# Patient Record
Sex: FEMALE | Race: AMERICAN INDIAN OR ALASKA NATIVE | NOT HISPANIC OR LATINO | Employment: OTHER | URBAN - METROPOLITAN AREA
[De-identification: names, ages, dates, MRNs, and addresses within clinical notes are randomized per-mention and may not be internally consistent; named-entity substitution may affect disease eponyms.]

---

## 2017-01-01 ENCOUNTER — HOSPITAL ENCOUNTER (EMERGENCY)
Facility: MEDICAL CENTER | Age: 32
End: 2017-01-02
Attending: EMERGENCY MEDICINE

## 2017-01-01 ENCOUNTER — APPOINTMENT (OUTPATIENT)
Dept: RADIOLOGY | Facility: MEDICAL CENTER | Age: 32
End: 2017-01-01
Attending: EMERGENCY MEDICINE

## 2017-01-01 DIAGNOSIS — M94.0 COSTOCHONDRITIS: ICD-10-CM

## 2017-01-01 DIAGNOSIS — R07.81 PLEURITIC CHEST PAIN: ICD-10-CM

## 2017-01-01 LAB
ALBUMIN SERPL BCP-MCNC: 4.1 G/DL (ref 3.2–4.9)
ALBUMIN/GLOB SERPL: 1.5 G/DL
ALP SERPL-CCNC: 76 U/L (ref 30–99)
ALT SERPL-CCNC: 14 U/L (ref 2–50)
ANION GAP SERPL CALC-SCNC: 8 MMOL/L (ref 0–11.9)
AST SERPL-CCNC: 14 U/L (ref 12–45)
BASOPHILS # BLD AUTO: 0.7 % (ref 0–1.8)
BASOPHILS # BLD: 0.06 K/UL (ref 0–0.12)
BILIRUB SERPL-MCNC: 0.5 MG/DL (ref 0.1–1.5)
BUN SERPL-MCNC: 10 MG/DL (ref 8–22)
CALCIUM SERPL-MCNC: 9.3 MG/DL (ref 8.5–10.5)
CHLORIDE SERPL-SCNC: 107 MMOL/L (ref 96–112)
CO2 SERPL-SCNC: 22 MMOL/L (ref 20–33)
CREAT SERPL-MCNC: 0.77 MG/DL (ref 0.5–1.4)
EKG IMPRESSION: NORMAL
EOSINOPHIL # BLD AUTO: 0.38 K/UL (ref 0–0.51)
EOSINOPHIL NFR BLD: 4.7 % (ref 0–6.9)
ERYTHROCYTE [DISTWIDTH] IN BLOOD BY AUTOMATED COUNT: 46.5 FL (ref 35.9–50)
GFR SERPL CREATININE-BSD FRML MDRD: >60 ML/MIN/1.73 M 2
GLOBULIN SER CALC-MCNC: 2.7 G/DL (ref 1.9–3.5)
GLUCOSE SERPL-MCNC: 95 MG/DL (ref 65–99)
HCG SERPL QL: NEGATIVE
HCT VFR BLD AUTO: 40.7 % (ref 37–47)
HGB BLD-MCNC: 13.1 G/DL (ref 12–16)
IMM GRANULOCYTES # BLD AUTO: 0.05 K/UL (ref 0–0.11)
IMM GRANULOCYTES NFR BLD AUTO: 0.6 % (ref 0–0.9)
LYMPHOCYTES # BLD AUTO: 2.59 K/UL (ref 1–4.8)
LYMPHOCYTES NFR BLD: 32 % (ref 22–41)
MCH RBC QN AUTO: 28.4 PG (ref 27–33)
MCHC RBC AUTO-ENTMCNC: 32.2 G/DL (ref 33.6–35)
MCV RBC AUTO: 88.1 FL (ref 81.4–97.8)
MONOCYTES # BLD AUTO: 0.66 K/UL (ref 0–0.85)
MONOCYTES NFR BLD AUTO: 8.1 % (ref 0–13.4)
NEUTROPHILS # BLD AUTO: 4.36 K/UL (ref 2–7.15)
NEUTROPHILS NFR BLD: 53.9 % (ref 44–72)
NRBC # BLD AUTO: 0 K/UL
NRBC BLD AUTO-RTO: 0 /100 WBC
PLATELET # BLD AUTO: 247 K/UL (ref 164–446)
PMV BLD AUTO: 10.7 FL (ref 9–12.9)
POTASSIUM SERPL-SCNC: 3.8 MMOL/L (ref 3.6–5.5)
PROT SERPL-MCNC: 6.8 G/DL (ref 6–8.2)
RBC # BLD AUTO: 4.62 M/UL (ref 4.2–5.4)
SODIUM SERPL-SCNC: 137 MMOL/L (ref 135–145)
TROPONIN I SERPL-MCNC: <0.01 NG/ML (ref 0–0.04)
WBC # BLD AUTO: 8.1 K/UL (ref 4.8–10.8)

## 2017-01-01 PROCEDURE — 94760 N-INVAS EAR/PLS OXIMETRY 1: CPT

## 2017-01-01 PROCEDURE — 99285 EMERGENCY DEPT VISIT HI MDM: CPT

## 2017-01-01 PROCEDURE — 84484 ASSAY OF TROPONIN QUANT: CPT

## 2017-01-01 PROCEDURE — 84703 CHORIONIC GONADOTROPIN ASSAY: CPT

## 2017-01-01 PROCEDURE — 80053 COMPREHEN METABOLIC PANEL: CPT

## 2017-01-01 PROCEDURE — 85025 COMPLETE CBC W/AUTO DIFF WBC: CPT

## 2017-01-01 PROCEDURE — 71020 DX-CHEST-2 VIEWS: CPT

## 2017-01-01 PROCEDURE — 93005 ELECTROCARDIOGRAM TRACING: CPT

## 2017-01-01 PROCEDURE — 96374 THER/PROPH/DIAG INJ IV PUSH: CPT

## 2017-01-01 PROCEDURE — 93005 ELECTROCARDIOGRAM TRACING: CPT | Performed by: EMERGENCY MEDICINE

## 2017-01-01 PROCEDURE — 700111 HCHG RX REV CODE 636 W/ 250 OVERRIDE (IP): Performed by: EMERGENCY MEDICINE

## 2017-01-01 RX ORDER — KETOROLAC TROMETHAMINE 30 MG/ML
30 INJECTION, SOLUTION INTRAMUSCULAR; INTRAVENOUS ONCE
Status: COMPLETED | OUTPATIENT
Start: 2017-01-01 | End: 2017-01-01

## 2017-01-01 RX ADMIN — KETOROLAC TROMETHAMINE 30 MG: 30 INJECTION, SOLUTION INTRAMUSCULAR; INTRAVENOUS at 23:14

## 2017-01-01 ASSESSMENT — LIFESTYLE VARIABLES
EVER FELT BAD OR GUILTY ABOUT YOUR DRINKING: NO
HAVE YOU EVER FELT YOU SHOULD CUT DOWN ON YOUR DRINKING: NO
HOW MANY TIMES IN THE PAST YEAR HAVE YOU HAD 5 OR MORE DRINKS IN A DAY: 0
TOTAL SCORE: 0
CONSUMPTION TOTAL: NEGATIVE
DO YOU DRINK ALCOHOL: YES
ON A TYPICAL DAY WHEN YOU DRINK ALCOHOL HOW MANY DRINKS DO YOU HAVE: 3
HAVE PEOPLE ANNOYED YOU BY CRITICIZING YOUR DRINKING: NO
EVER HAD A DRINK FIRST THING IN THE MORNING TO STEADY YOUR NERVES TO GET RID OF A HANGOVER: NO
AVERAGE NUMBER OF DAYS PER WEEK YOU HAVE A DRINK CONTAINING ALCOHOL: 1
TOTAL SCORE: 0
TOTAL SCORE: 0

## 2017-01-01 ASSESSMENT — ENCOUNTER SYMPTOMS
FEVER: 0
CHILLS: 0
DIAPHORESIS: 0

## 2017-01-01 ASSESSMENT — PAIN SCALES - GENERAL: PAINLEVEL_OUTOF10: 7

## 2017-01-01 NOTE — ED AVS SNAPSHOT
After Visit Summary                                                                                                                Radha oLve   MRN: [id number]    Department:  [facility], Emergency Dept   Date of Visit:  1/1/2017            [facility], Emergency Dept    [address] [address]    Phone:  [phone]      You were seen by     Pete Vaughn M.D.      Your Diagnosis Was     Costochondritis     M94.0       These are the medications you received during your hospitalization from 01/01/2017 2106 to 01/02/2017 0012     Date/Time Order Dose Route Action    01/01/2017 2314 ketorolac (TORADOL) injection 30 mg 30 mg Intravenous Given      Follow-up Information     1. Follow up with Your Physician. Schedule an appointment as soon as possible for a visit in 1 week.    Specialty:  Emergency Medicine    Contact information    Varies          2. Follow up with [facility].    Why:  If you need a doctor    Contact information    [address]  [facility] [address] [phone]          3. Follow up with [facility].    Why:  If you need a doctor    Contact information    [address] [address] [phone]      Medication Information     Review all of your home medications and newly ordered medications with your primary doctor and/or pharmacist as soon as possible. Follow medication instructions as directed by your doctor and/or pharmacist.     Please keep your complete medication list with you and share with your physician. Update the information when medications are discontinued, doses are changed, or new medications (including over-the-counter products) are added; and carry medication information at all times in the event of emergency situations.               Medication List      START taking these medications        Instructions    ibuprofen 600 MG Tabs   Commonly known as:  MOTRIN    Take 1 Tab by mouth every 6  hours as needed.   Dose:  600 mg               Procedures and tests performed during your visit     CBC WITH DIFFERENTIAL    COMP METABOLIC PANEL    Cardiac Monitoring    DX-CHEST-2 VIEWS    EKG (ER)    EKG (NOW)    ESTIMATED GFR    HCG Qual Serum    IV Saline Lock    Obtain Old EKG    Pulse Ox    TROPONIN        Discharge Instructions       Return if you have new or different chest pain, shortness of breath, cough up blood, fever, rash or pass out.     Costochondritis  Costochondritis, sometimes called Tietze syndrome, is a swelling and irritation (inflammation) of the tissue (cartilage) that connects your ribs with your breastbone (sternum). It causes pain in the chest and rib area. Costochondritis usually goes away on its own over time. It can take up to 6 weeks or longer to get better, especially if you are unable to limit your activities.  CAUSES   Some cases of costochondritis have no known cause. Possible causes include:  · Injury (trauma).  · Exercise or activity such as lifting.  · Severe coughing.  SIGNS AND SYMPTOMS  · Pain and tenderness in the chest and rib area.  · Pain that gets worse when coughing or taking deep breaths.  · Pain that gets worse with specific movements.  DIAGNOSIS   Your health care provider will do a physical exam and ask about your symptoms. Chest X-rays or other tests may be done to rule out other problems.  TREATMENT   Costochondritis usually goes away on its own over time. Your health care provider may prescribe medicine to help relieve pain.  HOME CARE INSTRUCTIONS   · Avoid exhausting physical activity. Try not to strain your ribs during normal activity. This would include any activities using chest, abdominal, and side muscles, especially if heavy weights are used.  · Apply ice to the affected area for the first 2 days after the pain begins.  · Put ice in a plastic bag.  · Place a towel between your skin and the bag.  · Leave the ice on for 20 minutes, 2-3 times a day.  · Only  take over-the-counter or prescription medicines as directed by your health care provider.  SEEK MEDICAL CARE IF:  · You have redness or swelling at the rib joints. These are signs of infection.  · Your pain does not go away despite rest or medicine.  SEEK IMMEDIATE MEDICAL CARE IF:   · Your pain increases or you are very uncomfortable.  · You have shortness of breath or difficulty breathing.  · You cough up blood.  · You have worse chest pains, sweating, or vomiting.  · You have a fever or persistent symptoms for more than 2-3 days.  · You have a fever and your symptoms suddenly get worse.  MAKE SURE YOU:   · Understand these instructions.  · Will watch your condition.  · Will get help right away if you are not doing well or get worse.     This information is not intended to replace advice given to you by your health care provider. Make sure you discuss any questions you have with your health care provider.     Document Released: 09/27/2006 Document Revised: 10/08/2014 Document Reviewed: 07/22/2014  [employer] Interactive Patient Education ©2016 [employer] Inc.        Chest Wall Pain  Chest wall pain is pain in or around the bones and muscles of your chest. It may take up to 6 weeks to get better. It may take longer if you must stay physically active in your work and activities.   CAUSES   Chest wall pain may happen on its own. However, it may be caused by:  · A viral illness like the flu.  · Injury.  · Coughing.  · Exercise.  · Arthritis.  · Fibromyalgia.  · Shingles.  HOME CARE INSTRUCTIONS   · Avoid overtiring physical activity. Try not to strain or perform activities that cause pain. This includes any activities using your chest or your abdominal and side muscles, especially if heavy weights are used.  · Put ice on the sore area.  ¨ Put ice in a plastic bag.  ¨ Place a towel between your skin and the bag.  ¨ Leave the ice on for 15-20 minutes per hour while awake for the first 2 days.  · Only take over-the-counter  or prescription medicines for pain, discomfort, or fever as directed by your caregiver.  SEEK IMMEDIATE MEDICAL CARE IF:   · Your pain increases, or you are very uncomfortable.  · You have a fever.  · Your chest pain becomes worse.  · You have new, unexplained symptoms.  · You have nausea or vomiting.  · You feel sweaty or lightheaded.  · You have a cough with phlegm (sputum), or you cough up blood.  MAKE SURE YOU:   · Understand these instructions.  · Will watch your condition.  · Will get help right away if you are not doing well or get worse.     This information is not intended to replace advice given to you by your health care provider. Make sure you discuss any questions you have with your health care provider.     Document Released: 12/18/2006 Document Revised: 03/11/2013 Document Reviewed: 03/14/2016  [employer] Interactive Patient Education ©2016 [employer]            Patient Information     Patient Information    Following emergency treatment: all patient requiring follow-up care must return either to a private physician or a clinic if your condition worsens before you are able to obtain further medical attention, please return to the emergency room.     Billing Information    At [facility], we work to make the billing process streamlined for our patients.  Our Representatives are here to answer any questions you may have regarding your hospital bill.  If you have insurance coverage and have supplied your insurance information to us, we will submit a claim to your insurer on your behalf.  Should you have any questions regarding your bill, we can be reached online or by phone as follows:  Online: You are able pay your bills online or live chat with our representatives about any billing questions you may have. We are here to help Monday - Friday from 8:00am to 7:30pm and 9:00am - 12:00pm on Saturdays.  Please visit [email or web address]  for more information.   Phone:   [phone] or [phone]    Please note that your emergency physician, surgeon, pathologist, radiologist, anesthesiologist, and other specialists are not employed by [facility] and will therefore bill separately for their services.  Please contact them directly for any questions concerning their bills at the numbers below:     Emergency Physician Services:  [phone]  [facility]:  [phone]  [facility]:  [phone]  [facility]:  [phone]    1. Your final bill may vary from the amount quoted upon discharge if all procedures are not complete at that time, or if your doctor has additional procedures of which we are not aware. You will receive an additional bill if you return to the Emergency Department at [facility] for suture removal regardless of the facility of which the sutures were placed.     2. Please arrange for settlement of this account at the emergency registration.    3. All self-pay accounts are due in full at the time of treatment.  If you are unable to meet this obligation then payment is expected within 4-5 days.     4. If you have had radiology studies (CT, X-ray, Ultrasound, MRI), you have received a preliminary result during your emergency department visit. Please contact the radiology department [phone] to receive a copy of your final result. Please discuss the Final result with your primary physician or with the follow up physician provided.     Crisis Hotline:  [place] Crisis Hotline:  6-702-QLHCADG or 1-964.674.5697  [place] Crisis Hotline:    1-881.175.2259 or 381-956-1402         ED Discharge Follow Up Questions    1. In order to provide you with very good care, we would like to follow up with a phone call in the next few days.  May we have your permission to contact you?     YES /  NO    2. What is the best phone number to call you? (       )_____-__________    3. What is the best time to call you?      Morning  /   Afternoon  /  Evening                   Patient Signature:  ____________________________________________________________    Date:  ____________________________________________________________

## 2017-01-01 NOTE — ED AVS SNAPSHOT
Hyper9 Access Code: [email or web address]  Expires: 2/1/2017 12:12 AM    Your email address is not on file at [employer].  Email Addresses are required for you to sign up for Hyper9, please contact [phone] to verify your personal information and to provide your email address prior to attempting to register for Hyper9.    Radha Love  [address]  [place] [address]    [employer]  A secure, online tool to manage your health information     [employer]’s Hyper9® is a secure, online tool that connects you to your personalized health information from the privacy of your home -- day or night - making it very easy for you to manage your healthcare. Once the activation process is completed, you can even access your medical information using the Hyper9 deborah, which is available for free in the Apple Deborah store or Google Play store.     To learn more about Hyper9, visit [email or web address]    There are two levels of access available (as shown below):   My Chart Features  [facility] Primary Care Doctor [facility]  Specialists [facility]  Urgent  Care Non-[facility] Primary Care Doctor   Email your healthcare team securely and privately 24/7 X X X    Manage appointments: schedule your next appointment; view details of past/upcoming appointments X      Request prescription refills. X      View recent personal medical records, including lab and immunizations X X X X   View health record, including health history, allergies, medications X X X X   Read reports about your outpatient visits, procedures, consult and ER notes X X X X   See your discharge summary, which is a recap of your hospital and/or ER visit that includes your diagnosis, lab results, and care plan X X  X     How to register for Hyper9:  Once your e-mail address has been verified, follow the following steps to sign up for Hyper9.     1. Go to  [email or web address]  2. Click on the Sign Up Now box, which takes you to the New Member Sign Up page.  You will need to provide the following information:  a. Enter your iZ3D Access Code exactly as it appears at the top of this page. (You will not need to use this code after you’ve completed the sign-up process. If you do not sign up before the expiration date, you must request a new code.)   b. Enter your date of birth.   c. Enter your home email address.   d. Click Submit, and follow the next screen’s instructions.  3. Create a [employer] ID. This will be your iZ3D login ID and cannot be changed, so think of one that is secure and easy to remember.  4. Create a iZ3D password. You can change your password at any time.  5. Enter your Password Reset Question and Answer. This can be used at a later time if you forget your password.   6. Enter your e-mail address. This allows you to receive e-mail notifications when new information is available in iZ3D.  7. Click Sign Up. You can now view your health information.    For assistance activating your iZ3D account, call [phone]

## 2017-01-01 NOTE — ED AVS SNAPSHOT
1/2/2017          Radha Love  [address]  [place] [address]    Dear Radha:    [facility] wants to ensure your discharge home is safe and you or your loved ones have had all your questions answered regarding your care after you leave the hospital.    You may receive a telephone call within two days of your discharge.  This call is to make certain you understand your discharge instructions as well as ensure we provided you with the best care possible during your stay with us.     The call will only last approximately 3-5 minutes and will be done by a nurse.    Once again, we want to ensure your discharge home is safe and that you have a clear understanding of any next steps in your care.  If you have any questions or concerns, please do not hesitate to contact us, we are here for you.  Thank you for choosing [facility] for your healthcare needs.    Sincerely,    Rome Garcia    [facility]

## 2017-01-02 VITALS
DIASTOLIC BLOOD PRESSURE: 86 MMHG | BODY MASS INDEX: 28.83 KG/M2 | SYSTOLIC BLOOD PRESSURE: 136 MMHG | TEMPERATURE: 97 F | RESPIRATION RATE: 18 BRPM | HEART RATE: 54 BPM | WEIGHT: 173.06 LBS | HEIGHT: 65 IN | OXYGEN SATURATION: 94 %

## 2017-01-02 RX ORDER — IBUPROFEN 600 MG/1
600 TABLET ORAL EVERY 6 HOURS PRN
Qty: 30 TAB | Refills: 0 | Status: SHIPPED | OUTPATIENT
Start: 2017-01-02

## 2017-01-02 NOTE — ED PROVIDER NOTES
"ED Provider Note    Scribed for Pete Vaughn M.D. by Camille Powers. 1/1/2017, 10:38 PM.    Primary care provider: No primary care provider on file.  Means of arrival: Walk-in  History obtained from: Patient  History limited by: None    CHIEF COMPLAINT  Chief Complaint   Patient presents with   • Chest Pain     left breast and chest pain. painful with movement of arm. pain with deep breathing. pt states it hurts too much to lay down.        HPI  Radha Love is a 31 y.o. female who presents to the Emergency Department for evaluation of chest pain that began 1.5 weeks ago. Patient locates the pain to left breast. Denies fever or chills. Denies leg swelling, hemoptysis, or diaphoresis. Patient reports coughing, sneezing, ambulation exaceberates the pain. Patient report recent cold-like symptoms with associated cough, sputum production, rhinorrhea, and sore throat last week. Denies rash. Denies abdominal pain. Denies trauma to the chest or recent surgeries, birth control use, or history of caner. Patient reports history of chicken pox.  N DVT, MI, COPD or emphysema. Patient reports history of marijuana use.     REVIEW OF SYSTEMS  Review of Systems   Constitutional: Negative for fever, chills and diaphoresis.   Cardiovascular: Negative for leg swelling.   Musculoskeletal:        Left breast pain    Skin: Negative for rash.       PAST MEDICAL HISTORY   None noted     SURGICAL HISTORY  patient denies any surgical history    SOCIAL HISTORY  History of marijuana use.     FAMILY HISTORY  None noted     CURRENT MEDICATIONS  No current facility-administered medications on file prior to encounter.     No current outpatient prescriptions on file prior to encounter.       ALLERGIES  No Known Allergies    PHYSICAL EXAM  VITAL SIGNS: /86 mmHg  Pulse 82  Temp(Src) 36.1 °C (97 °F)  Resp 18  Ht 1.651 m (5' 5\")  Wt 78.5 kg (173 lb 1 oz)  BMI 28.80 kg/m2    Constitutional: Well developed, Well nourished, " Mild distress.   HENT: Normocephalic, Atraumatic,   Eyes: Conjunctiva normal, No discharge.   Cardiovascular: Normal heart rate, Normal rhythm, No murmurs, equal pulses.   Pulmonary: Normal breath sounds, No respiratory distress, No wheezing, No rales, No rhonchi.  Chest: reproducible sternum and chest wall tenderness upon palpation tenderness to left rib 7,8.9 no step off no crepitus No  deformity.   Abdomen:Soft, No tenderness,  Musculoskeletal:  No major deformities noted, No tenderness. No swelling to calves, appear symmetric, no cords are felt   Skin: Warm, Dry, No erythema, No rash. No sensitivy to light touch over the area  Neurologic: Alert & oriented x 3, Normal motor function,  No focal deficits noted.   Psychiatric: Affect normal, Judgment normal, Mood normal.       LABS  Results for orders placed or performed during the hospital encounter of 01/01/17   CBC WITH DIFFERENTIAL   Result Value Ref Range    WBC 8.1 4.8 - 10.8 K/uL    RBC 4.62 4.20 - 5.40 M/uL    Hemoglobin 13.1 12.0 - 16.0 g/dL    Hematocrit 40.7 37.0 - 47.0 %    MCV 88.1 81.4 - 97.8 fL    MCH 28.4 27.0 - 33.0 pg    MCHC 32.2 (L) 33.6 - 35.0 g/dL    RDW 46.5 35.9 - 50.0 fL    Platelet Count 247 164 - 446 K/uL    MPV 10.7 9.0 - 12.9 fL    Neutrophils-Polys 53.90 44.00 - 72.00 %    Lymphocytes 32.00 22.00 - 41.00 %    Monocytes 8.10 0.00 - 13.40 %    Eosinophils 4.70 0.00 - 6.90 %    Basophils 0.70 0.00 - 1.80 %    Immature Granulocytes 0.60 0.00 - 0.90 %    Nucleated RBC 0.00 /100 WBC    Neutrophils (Absolute) 4.36 2.00 - 7.15 K/uL    Lymphs (Absolute) 2.59 1.00 - 4.80 K/uL    Monos (Absolute) 0.66 0.00 - 0.85 K/uL    Eos (Absolute) 0.38 0.00 - 0.51 K/uL    Baso (Absolute) 0.06 0.00 - 0.12 K/uL    Immature Granulocytes (abs) 0.05 0.00 - 0.11 K/uL    NRBC (Absolute) 0.00 K/uL   COMP METABOLIC PANEL   Result Value Ref Range    Sodium 137 135 - 145 mmol/L    Potassium 3.8 3.6 - 5.5 mmol/L    Chloride 107 96 - 112 mmol/L    Co2 22 20 - 33 mmol/L     Anion Gap 8.0 0.0 - 11.9    Glucose 95 65 - 99 mg/dL    Bun 10 8 - 22 mg/dL    Creatinine 0.77 0.50 - 1.40 mg/dL    Calcium 9.3 8.5 - 10.5 mg/dL    AST(SGOT) 14 12 - 45 U/L    ALT(SGPT) 14 2 - 50 U/L    Alkaline Phosphatase 76 30 - 99 U/L    Total Bilirubin 0.5 0.1 - 1.5 mg/dL    Albumin 4.1 3.2 - 4.9 g/dL    Total Protein 6.8 6.0 - 8.2 g/dL    Globulin 2.7 1.9 - 3.5 g/dL    A-G Ratio 1.5 g/dL   TROPONIN   Result Value Ref Range    Troponin I <0.01 0.00 - 0.04 ng/mL   HCG Qual Serum   Result Value Ref Range    Beta-Hcg Qualitative Serum Negative Negative   ESTIMATED GFR   Result Value Ref Range    GFR If African American >60 >60 mL/min/1.73 m 2    GFR If Non African American >60 >60 mL/min/1.73 m 2   EKG (ER)   Result Value Ref Range    Report       [facility] Emergency Dept.    Test Date:  2017  Pt Name:    JW CASEY             Department: ER  MRN:        [id number]                      Room:  Gender:     F                            Technician: [id number]  :        [id number]                   Requested By:ER TRIAGE PROTOCOL  Order #:    [id number]                    Reading MD:    Measurements  Intervals                                Axis  Rate:       60                           P:          30  NJ:         144                          QRS:        26  QRSD:       78                           T:          19  QT:         424  QTc:        424    Interpretive Statements  SINUS RHYTHM  No previous ECG available for comparison         All labs reviewed by me.    EKG  12 Lead EKG interpreted by me shows a normal sinus rhythm at a rate of 60. Axis normal. No ST elevations. No T wave inversions. NJ interval 144  Qtc 424  No old EKG for comparison. Final impression: normal sinus rhythm.    RADIOLOGY  DX-CHEST-2 VIEWS   Final Result      No acute findings.        The radiologist's interpretation of all radiological studies have been reviewed by me.    COURSE & MEDICAL DECISION MAKING  Pertinent  Labs & Imaging studies reviewed. (See chart for details)    10:38 PM - Patient seen and examined at bedside. Patient will be treated with Toradol 30 mg. Ordered DX Chest, CBC with Differential, CMP, Troponin, HCG Qual Serum, (see lab orders) to evaluate her symptoms. The differential diagnoses include but are not limited to: pneumonia      12:17 AM- Patient was reevaluated at bedside. Discussed lab  and radiology  results with the patient and informed them that Heart enzyme test was negative, no pneumonia, no signs of blood clot, but possible strain and unspecified virus. Advised patient return to E.D if worsening of symptoms including hematemesis or rash. Patient is well appearing and her vital signs are reassuring. Patient verbalizes understanding and agreement with discharge.       Medical Decision Making: Patient presents with sharp chest pain worse with taking a deep breath. She's had a recent upper respiratory tract infection which sounds like a viral illness. Chest pain is reproducible with palpation at this point in time I think this is most likely costochondritis or pleurisy. I will treat the patient with ibuprofen. I do not think this is cardiac in nature is been going on a week she hasn't unremarkable EKG and a negative troponin. I do not think his pulmonary embolism she has no signs of DVT, she is not hypoxic she is not tachycardic no risk factors her PERC is 0. Chest pain is not ripping or tearing does not hurt her back she has a normal aortic knob on chest x-ray do not think this is aortic dissection. No signs of pneumonia on chest x-ray.  The patient will return for new or worsening symptoms and is stable at the time of discharge.    The patient is referred to a primary physician for blood pressure management, diabetic screening, and for all other preventative health concerns.    DISPOSITION:  Patient will be discharged home in stable condition.    FOLLOW UP:  Your Physician  Varies    Schedule an  appointment as soon as possible for a visit in 1 week      [facility]  [address]  [place] [address]  [phone]      If you need a doctor    [address] [address]  [phone]    If you need a doctor      OUTPATIENT MEDICATIONS:  Discharge Medication List as of 1/2/2017 12:12 AM      START taking these medications    Details   ibuprofen (MOTRIN) 600 MG Tab Take 1 Tab by mouth every 6 hours as needed., Disp-30 Tab, R-0, Print Rx Paper             FINAL IMPRESSION  1. Costochondritis    2. Pleuritic chest pain          Camille BRAY (Scribe), am scribing for, and in the presence of, Pete Vaughn M.D.    Electronically signed by: Camille Powers (Scribe), 1/1/2017    IPete M.D. personally performed the services described in this documentation, as scribed by Camille Powers in my presence, and it is both accurate and complete.    The note accurately reflects work and decisions made by me.  Pete Vaughn  1/2/2017  12:47 AM

## 2017-01-02 NOTE — ED NOTES
Report from Kriss AVENDAÑO.  PIV initiated.  Pt medicated per mar.  Pt understands plan of care.  Visitor at bedside.

## 2017-01-02 NOTE — DISCHARGE INSTRUCTIONS
Return if you have new or different chest pain, shortness of breath, cough up blood, fever, rash or pass out.     Costochondritis  Costochondritis, sometimes called Tietze syndrome, is a swelling and irritation (inflammation) of the tissue (cartilage) that connects your ribs with your breastbone (sternum). It causes pain in the chest and rib area. Costochondritis usually goes away on its own over time. It can take up to 6 weeks or longer to get better, especially if you are unable to limit your activities.  CAUSES   Some cases of costochondritis have no known cause. Possible causes include:  · Injury (trauma).  · Exercise or activity such as lifting.  · Severe coughing.  SIGNS AND SYMPTOMS  · Pain and tenderness in the chest and rib area.  · Pain that gets worse when coughing or taking deep breaths.  · Pain that gets worse with specific movements.  DIAGNOSIS   Your health care provider will do a physical exam and ask about your symptoms. Chest X-rays or other tests may be done to rule out other problems.  TREATMENT   Costochondritis usually goes away on its own over time. Your health care provider may prescribe medicine to help relieve pain.  HOME CARE INSTRUCTIONS   · Avoid exhausting physical activity. Try not to strain your ribs during normal activity. This would include any activities using chest, abdominal, and side muscles, especially if heavy weights are used.  · Apply ice to the affected area for the first 2 days after the pain begins.  · Put ice in a plastic bag.  · Place a towel between your skin and the bag.  · Leave the ice on for 20 minutes, 2-3 times a day.  · Only take over-the-counter or prescription medicines as directed by your health care provider.  SEEK MEDICAL CARE IF:  · You have redness or swelling at the rib joints. These are signs of infection.  · Your pain does not go away despite rest or medicine.  SEEK IMMEDIATE MEDICAL CARE IF:   · Your pain increases or you are very  uncomfortable.  · You have shortness of breath or difficulty breathing.  · You cough up blood.  · You have worse chest pains, sweating, or vomiting.  · You have a fever or persistent symptoms for more than 2-3 days.  · You have a fever and your symptoms suddenly get worse.  MAKE SURE YOU:   · Understand these instructions.  · Will watch your condition.  · Will get help right away if you are not doing well or get worse.     This information is not intended to replace advice given to you by your health care provider. Make sure you discuss any questions you have with your health care provider.     Document Released: 09/27/2006 Document Revised: 10/08/2014 Document Reviewed: 07/22/2014  [employer] Patient Education ©2016 [employer]        Chest Wall Pain  Chest wall pain is pain in or around the bones and muscles of your chest. It may take up to 6 weeks to get better. It may take longer if you must stay physically active in your work and activities.   CAUSES   Chest wall pain may happen on its own. However, it may be caused by:  · A viral illness like the flu.  · Injury.  · Coughing.  · Exercise.  · Arthritis.  · Fibromyalgia.  · Shingles.  HOME CARE INSTRUCTIONS   · Avoid overtiring physical activity. Try not to strain or perform activities that cause pain. This includes any activities using your chest or your abdominal and side muscles, especially if heavy weights are used.  · Put ice on the sore area.  ¨ Put ice in a plastic bag.  ¨ Place a towel between your skin and the bag.  ¨ Leave the ice on for 15-20 minutes per hour while awake for the first 2 days.  · Only take over-the-counter or prescription medicines for pain, discomfort, or fever as directed by your caregiver.  SEEK IMMEDIATE MEDICAL CARE IF:   · Your pain increases, or you are very uncomfortable.  · You have a fever.  · Your chest pain becomes worse.  · You have new, unexplained symptoms.  · You have nausea or vomiting.  · You feel sweaty or  lightheaded.  · You have a cough with phlegm (sputum), or you cough up blood.  MAKE SURE YOU:   · Understand these instructions.  · Will watch your condition.  · Will get help right away if you are not doing well or get worse.     This information is not intended to replace advice given to you by your health care provider. Make sure you discuss any questions you have with your health care provider.     Document Released: 12/18/2006 Document Revised: 03/11/2013 Document Reviewed: 03/14/2016  [employer] Interactive Patient Education ©2016 [employer]

## 2017-01-02 NOTE — ED NOTES
Chief Complaint   Patient presents with   • Chest Pain     left breast and chest pain. painful with movement of arm. pain with deep breathing. pt states it hurts too much to lay down.

## 2017-01-02 NOTE — PROGRESS NOTES
Pt verbalizes understanding of discharge and follow-up instructions.  PIV removed.  Given Rx X1.  VSS.  All questions answered.  Ambulates to discharge with steady gait.